# Patient Record
Sex: MALE | Race: OTHER | HISPANIC OR LATINO | URBAN - METROPOLITAN AREA
[De-identification: names, ages, dates, MRNs, and addresses within clinical notes are randomized per-mention and may not be internally consistent; named-entity substitution may affect disease eponyms.]

---

## 2023-10-21 ENCOUNTER — EMERGENCY (EMERGENCY)
Facility: HOSPITAL | Age: 23
LOS: 0 days | Discharge: HOME | End: 2023-10-21
Attending: STUDENT IN AN ORGANIZED HEALTH CARE EDUCATION/TRAINING PROGRAM
Payer: COMMERCIAL

## 2023-10-21 VITALS
DIASTOLIC BLOOD PRESSURE: 78 MMHG | RESPIRATION RATE: 14 BRPM | SYSTOLIC BLOOD PRESSURE: 154 MMHG | HEART RATE: 88 BPM | OXYGEN SATURATION: 100 % | TEMPERATURE: 98 F | HEIGHT: 68 IN | WEIGHT: 199.96 LBS

## 2023-10-21 VITALS
OXYGEN SATURATION: 100 % | RESPIRATION RATE: 20 BRPM | DIASTOLIC BLOOD PRESSURE: 86 MMHG | TEMPERATURE: 98 F | HEART RATE: 99 BPM | SYSTOLIC BLOOD PRESSURE: 140 MMHG

## 2023-10-21 DIAGNOSIS — M79.602 PAIN IN LEFT ARM: ICD-10-CM
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
## 2023-10-21 DIAGNOSIS — S42.302A UNSPECIFIED FRACTURE OF SHAFT OF HUMERUS, LEFT ARM, INITIAL ENCOUNTER FOR CLOSED FRACTURE: ICD-10-CM

## 2023-10-21 DIAGNOSIS — Z23 ENCOUNTER FOR IMMUNIZATION: ICD-10-CM

## 2023-10-21 LAB
ALBUMIN SERPL ELPH-MCNC: 3.9 G/DL — SIGNIFICANT CHANGE UP (ref 3.3–5)
ALBUMIN SERPL ELPH-MCNC: 3.9 G/DL — SIGNIFICANT CHANGE UP (ref 3.3–5)
ALP SERPL-CCNC: 98 U/L — SIGNIFICANT CHANGE UP (ref 40–120)
ALP SERPL-CCNC: 98 U/L — SIGNIFICANT CHANGE UP (ref 40–120)
ALT FLD-CCNC: 101 U/L — HIGH (ref 12–78)
ALT FLD-CCNC: 101 U/L — HIGH (ref 12–78)
ANION GAP SERPL CALC-SCNC: 6 MMOL/L — SIGNIFICANT CHANGE UP (ref 5–17)
ANION GAP SERPL CALC-SCNC: 6 MMOL/L — SIGNIFICANT CHANGE UP (ref 5–17)
APTT BLD: 27.9 SEC — SIGNIFICANT CHANGE UP (ref 24.5–35.6)
APTT BLD: 27.9 SEC — SIGNIFICANT CHANGE UP (ref 24.5–35.6)
AST SERPL-CCNC: 45 U/L — HIGH (ref 15–37)
AST SERPL-CCNC: 45 U/L — HIGH (ref 15–37)
BASOPHILS # BLD AUTO: 0.04 K/UL — SIGNIFICANT CHANGE UP (ref 0–0.2)
BASOPHILS # BLD AUTO: 0.04 K/UL — SIGNIFICANT CHANGE UP (ref 0–0.2)
BASOPHILS NFR BLD AUTO: 0.3 % — SIGNIFICANT CHANGE UP (ref 0–2)
BASOPHILS NFR BLD AUTO: 0.3 % — SIGNIFICANT CHANGE UP (ref 0–2)
BILIRUB SERPL-MCNC: 0.4 MG/DL — SIGNIFICANT CHANGE UP (ref 0.2–1.2)
BILIRUB SERPL-MCNC: 0.4 MG/DL — SIGNIFICANT CHANGE UP (ref 0.2–1.2)
BLD GP AB SCN SERPL QL: SIGNIFICANT CHANGE UP
BLD GP AB SCN SERPL QL: SIGNIFICANT CHANGE UP
BUN SERPL-MCNC: 15 MG/DL — SIGNIFICANT CHANGE UP (ref 7–23)
BUN SERPL-MCNC: 15 MG/DL — SIGNIFICANT CHANGE UP (ref 7–23)
CALCIUM SERPL-MCNC: 8.5 MG/DL — SIGNIFICANT CHANGE UP (ref 8.5–10.1)
CALCIUM SERPL-MCNC: 8.5 MG/DL — SIGNIFICANT CHANGE UP (ref 8.5–10.1)
CHLORIDE SERPL-SCNC: 103 MMOL/L — SIGNIFICANT CHANGE UP (ref 96–108)
CHLORIDE SERPL-SCNC: 103 MMOL/L — SIGNIFICANT CHANGE UP (ref 96–108)
CO2 SERPL-SCNC: 28 MMOL/L — SIGNIFICANT CHANGE UP (ref 22–31)
CO2 SERPL-SCNC: 28 MMOL/L — SIGNIFICANT CHANGE UP (ref 22–31)
CREAT SERPL-MCNC: 0.93 MG/DL — SIGNIFICANT CHANGE UP (ref 0.5–1.3)
CREAT SERPL-MCNC: 0.93 MG/DL — SIGNIFICANT CHANGE UP (ref 0.5–1.3)
EGFR: 118 ML/MIN/1.73M2 — SIGNIFICANT CHANGE UP
EGFR: 118 ML/MIN/1.73M2 — SIGNIFICANT CHANGE UP
EOSINOPHIL # BLD AUTO: 0.15 K/UL — SIGNIFICANT CHANGE UP (ref 0–0.5)
EOSINOPHIL # BLD AUTO: 0.15 K/UL — SIGNIFICANT CHANGE UP (ref 0–0.5)
EOSINOPHIL NFR BLD AUTO: 1.2 % — SIGNIFICANT CHANGE UP (ref 0–6)
EOSINOPHIL NFR BLD AUTO: 1.2 % — SIGNIFICANT CHANGE UP (ref 0–6)
GLUCOSE SERPL-MCNC: 113 MG/DL — HIGH (ref 70–99)
GLUCOSE SERPL-MCNC: 113 MG/DL — HIGH (ref 70–99)
HCT VFR BLD CALC: 44.4 % — SIGNIFICANT CHANGE UP (ref 39–50)
HCT VFR BLD CALC: 44.4 % — SIGNIFICANT CHANGE UP (ref 39–50)
HGB BLD-MCNC: 14.8 G/DL — SIGNIFICANT CHANGE UP (ref 13–17)
HGB BLD-MCNC: 14.8 G/DL — SIGNIFICANT CHANGE UP (ref 13–17)
IMM GRANULOCYTES NFR BLD AUTO: 0.5 % — SIGNIFICANT CHANGE UP (ref 0–0.9)
IMM GRANULOCYTES NFR BLD AUTO: 0.5 % — SIGNIFICANT CHANGE UP (ref 0–0.9)
INR BLD: 1.05 RATIO — SIGNIFICANT CHANGE UP (ref 0.85–1.18)
INR BLD: 1.05 RATIO — SIGNIFICANT CHANGE UP (ref 0.85–1.18)
LYMPHOCYTES # BLD AUTO: 27.4 % — SIGNIFICANT CHANGE UP (ref 13–44)
LYMPHOCYTES # BLD AUTO: 27.4 % — SIGNIFICANT CHANGE UP (ref 13–44)
LYMPHOCYTES # BLD AUTO: 3.37 K/UL — HIGH (ref 1–3.3)
LYMPHOCYTES # BLD AUTO: 3.37 K/UL — HIGH (ref 1–3.3)
MCHC RBC-ENTMCNC: 27.4 PG — SIGNIFICANT CHANGE UP (ref 27–34)
MCHC RBC-ENTMCNC: 27.4 PG — SIGNIFICANT CHANGE UP (ref 27–34)
MCHC RBC-ENTMCNC: 33.3 GM/DL — SIGNIFICANT CHANGE UP (ref 32–36)
MCHC RBC-ENTMCNC: 33.3 GM/DL — SIGNIFICANT CHANGE UP (ref 32–36)
MCV RBC AUTO: 82.2 FL — SIGNIFICANT CHANGE UP (ref 80–100)
MCV RBC AUTO: 82.2 FL — SIGNIFICANT CHANGE UP (ref 80–100)
MONOCYTES # BLD AUTO: 0.93 K/UL — HIGH (ref 0–0.9)
MONOCYTES # BLD AUTO: 0.93 K/UL — HIGH (ref 0–0.9)
MONOCYTES NFR BLD AUTO: 7.6 % — SIGNIFICANT CHANGE UP (ref 2–14)
MONOCYTES NFR BLD AUTO: 7.6 % — SIGNIFICANT CHANGE UP (ref 2–14)
NEUTROPHILS # BLD AUTO: 7.73 K/UL — HIGH (ref 1.8–7.4)
NEUTROPHILS # BLD AUTO: 7.73 K/UL — HIGH (ref 1.8–7.4)
NEUTROPHILS NFR BLD AUTO: 63 % — SIGNIFICANT CHANGE UP (ref 43–77)
NEUTROPHILS NFR BLD AUTO: 63 % — SIGNIFICANT CHANGE UP (ref 43–77)
PLATELET # BLD AUTO: 289 K/UL — SIGNIFICANT CHANGE UP (ref 150–400)
PLATELET # BLD AUTO: 289 K/UL — SIGNIFICANT CHANGE UP (ref 150–400)
POTASSIUM SERPL-MCNC: 3.3 MMOL/L — LOW (ref 3.5–5.3)
POTASSIUM SERPL-MCNC: 3.3 MMOL/L — LOW (ref 3.5–5.3)
POTASSIUM SERPL-SCNC: 3.3 MMOL/L — LOW (ref 3.5–5.3)
POTASSIUM SERPL-SCNC: 3.3 MMOL/L — LOW (ref 3.5–5.3)
PROT SERPL-MCNC: 7.5 GM/DL — SIGNIFICANT CHANGE UP (ref 6–8.3)
PROT SERPL-MCNC: 7.5 GM/DL — SIGNIFICANT CHANGE UP (ref 6–8.3)
PROTHROM AB SERPL-ACNC: 11.9 SEC — SIGNIFICANT CHANGE UP (ref 9.5–13)
PROTHROM AB SERPL-ACNC: 11.9 SEC — SIGNIFICANT CHANGE UP (ref 9.5–13)
RBC # BLD: 5.4 M/UL — SIGNIFICANT CHANGE UP (ref 4.2–5.8)
RBC # BLD: 5.4 M/UL — SIGNIFICANT CHANGE UP (ref 4.2–5.8)
RBC # FLD: 13.7 % — SIGNIFICANT CHANGE UP (ref 10.3–14.5)
RBC # FLD: 13.7 % — SIGNIFICANT CHANGE UP (ref 10.3–14.5)
SODIUM SERPL-SCNC: 137 MMOL/L — SIGNIFICANT CHANGE UP (ref 135–145)
SODIUM SERPL-SCNC: 137 MMOL/L — SIGNIFICANT CHANGE UP (ref 135–145)
WBC # BLD: 12.28 K/UL — HIGH (ref 3.8–10.5)
WBC # BLD: 12.28 K/UL — HIGH (ref 3.8–10.5)
WBC # FLD AUTO: 12.28 K/UL — HIGH (ref 3.8–10.5)
WBC # FLD AUTO: 12.28 K/UL — HIGH (ref 3.8–10.5)

## 2023-10-21 PROCEDURE — 85610 PROTHROMBIN TIME: CPT

## 2023-10-21 PROCEDURE — 74177 CT ABD & PELVIS W/CONTRAST: CPT | Mod: MA

## 2023-10-21 PROCEDURE — 86901 BLOOD TYPING SEROLOGIC RH(D): CPT

## 2023-10-21 PROCEDURE — 85025 COMPLETE CBC W/AUTO DIFF WBC: CPT

## 2023-10-21 PROCEDURE — 80053 COMPREHEN METABOLIC PANEL: CPT

## 2023-10-21 PROCEDURE — 99285 EMERGENCY DEPT VISIT HI MDM: CPT | Mod: 25

## 2023-10-21 PROCEDURE — 99243 OFF/OP CNSLTJ NEW/EST LOW 30: CPT

## 2023-10-21 PROCEDURE — 96374 THER/PROPH/DIAG INJ IV PUSH: CPT | Mod: XU

## 2023-10-21 PROCEDURE — 86900 BLOOD TYPING SEROLOGIC ABO: CPT

## 2023-10-21 PROCEDURE — 71260 CT THORAX DX C+: CPT | Mod: 26,MA

## 2023-10-21 PROCEDURE — 93010 ELECTROCARDIOGRAM REPORT: CPT

## 2023-10-21 PROCEDURE — 71260 CT THORAX DX C+: CPT | Mod: MA

## 2023-10-21 PROCEDURE — 73080 X-RAY EXAM OF ELBOW: CPT | Mod: LT

## 2023-10-21 PROCEDURE — 73060 X-RAY EXAM OF HUMERUS: CPT | Mod: LT

## 2023-10-21 PROCEDURE — 90715 TDAP VACCINE 7 YRS/> IM: CPT

## 2023-10-21 PROCEDURE — 73060 X-RAY EXAM OF HUMERUS: CPT | Mod: 26,LT,76

## 2023-10-21 PROCEDURE — 93005 ELECTROCARDIOGRAM TRACING: CPT

## 2023-10-21 PROCEDURE — 99291 CRITICAL CARE FIRST HOUR: CPT

## 2023-10-21 PROCEDURE — 72125 CT NECK SPINE W/O DYE: CPT | Mod: MA

## 2023-10-21 PROCEDURE — 74177 CT ABD & PELVIS W/CONTRAST: CPT | Mod: 26,MA

## 2023-10-21 PROCEDURE — 24505 CLTX HUMRL SHFT FX W/MNPJ: CPT | Mod: LT

## 2023-10-21 PROCEDURE — 73080 X-RAY EXAM OF ELBOW: CPT | Mod: 26,LT

## 2023-10-21 PROCEDURE — 70450 CT HEAD/BRAIN W/O DYE: CPT | Mod: MA

## 2023-10-21 PROCEDURE — 96376 TX/PRO/DX INJ SAME DRUG ADON: CPT | Mod: XU

## 2023-10-21 PROCEDURE — 72125 CT NECK SPINE W/O DYE: CPT | Mod: 26,MA

## 2023-10-21 PROCEDURE — 36415 COLL VENOUS BLD VENIPUNCTURE: CPT

## 2023-10-21 PROCEDURE — 86850 RBC ANTIBODY SCREEN: CPT

## 2023-10-21 PROCEDURE — 70450 CT HEAD/BRAIN W/O DYE: CPT | Mod: 26,MA

## 2023-10-21 PROCEDURE — 85730 THROMBOPLASTIN TIME PARTIAL: CPT

## 2023-10-21 PROCEDURE — 96375 TX/PRO/DX INJ NEW DRUG ADDON: CPT | Mod: XU

## 2023-10-21 PROCEDURE — 90471 IMMUNIZATION ADMIN: CPT

## 2023-10-21 RX ORDER — DIAZEPAM 5 MG
5 TABLET ORAL ONCE
Refills: 0 | Status: DISCONTINUED | OUTPATIENT
Start: 2023-10-21 | End: 2023-10-21

## 2023-10-21 RX ORDER — HYDROMORPHONE HYDROCHLORIDE 2 MG/ML
1 INJECTION INTRAMUSCULAR; INTRAVENOUS; SUBCUTANEOUS ONCE
Refills: 0 | Status: DISCONTINUED | OUTPATIENT
Start: 2023-10-21 | End: 2023-10-21

## 2023-10-21 RX ORDER — CEFAZOLIN SODIUM 1 G
1000 VIAL (EA) INJECTION ONCE
Refills: 0 | Status: COMPLETED | OUTPATIENT
Start: 2023-10-21 | End: 2023-10-21

## 2023-10-21 RX ORDER — CEFAZOLIN SODIUM 1 G
1000 VIAL (EA) INJECTION ONCE
Refills: 0 | Status: DISCONTINUED | OUTPATIENT
Start: 2023-10-21 | End: 2023-10-21

## 2023-10-21 RX ORDER — SODIUM CHLORIDE 9 MG/ML
1000 INJECTION INTRAMUSCULAR; INTRAVENOUS; SUBCUTANEOUS ONCE
Refills: 0 | Status: COMPLETED | OUTPATIENT
Start: 2023-10-21 | End: 2023-10-21

## 2023-10-21 RX ORDER — TETANUS TOXOID, REDUCED DIPHTHERIA TOXOID AND ACELLULAR PERTUSSIS VACCINE, ADSORBED 5; 2.5; 8; 8; 2.5 [IU]/.5ML; [IU]/.5ML; UG/.5ML; UG/.5ML; UG/.5ML
0.5 SUSPENSION INTRAMUSCULAR ONCE
Refills: 0 | Status: COMPLETED | OUTPATIENT
Start: 2023-10-21 | End: 2023-10-21

## 2023-10-21 RX ORDER — ONDANSETRON 8 MG/1
4 TABLET, FILM COATED ORAL ONCE
Refills: 0 | Status: COMPLETED | OUTPATIENT
Start: 2023-10-21 | End: 2023-10-21

## 2023-10-21 RX ORDER — FENTANYL CITRATE 50 UG/ML
100 INJECTION INTRAVENOUS ONCE
Refills: 0 | Status: DISCONTINUED | OUTPATIENT
Start: 2023-10-21 | End: 2023-10-21

## 2023-10-21 RX ORDER — OXYCODONE AND ACETAMINOPHEN 5; 325 MG/1; MG/1
1 TABLET ORAL
Qty: 16 | Refills: 0
Start: 2023-10-21 | End: 2023-10-24

## 2023-10-21 RX ADMIN — Medication 5 MILLIGRAM(S): at 14:43

## 2023-10-21 RX ADMIN — HYDROMORPHONE HYDROCHLORIDE 1 MILLIGRAM(S): 2 INJECTION INTRAMUSCULAR; INTRAVENOUS; SUBCUTANEOUS at 13:43

## 2023-10-21 RX ADMIN — SODIUM CHLORIDE 1000 MILLILITER(S): 9 INJECTION INTRAMUSCULAR; INTRAVENOUS; SUBCUTANEOUS at 13:43

## 2023-10-21 RX ADMIN — Medication 1000 MILLIGRAM(S): at 14:09

## 2023-10-21 RX ADMIN — HYDROMORPHONE HYDROCHLORIDE 1 MILLIGRAM(S): 2 INJECTION INTRAMUSCULAR; INTRAVENOUS; SUBCUTANEOUS at 16:00

## 2023-10-21 RX ADMIN — HYDROMORPHONE HYDROCHLORIDE 1 MILLIGRAM(S): 2 INJECTION INTRAMUSCULAR; INTRAVENOUS; SUBCUTANEOUS at 17:40

## 2023-10-21 RX ADMIN — ONDANSETRON 4 MILLIGRAM(S): 8 TABLET, FILM COATED ORAL at 13:43

## 2023-10-21 RX ADMIN — HYDROMORPHONE HYDROCHLORIDE 1 MILLIGRAM(S): 2 INJECTION INTRAMUSCULAR; INTRAVENOUS; SUBCUTANEOUS at 16:46

## 2023-10-21 RX ADMIN — FENTANYL CITRATE 100 MICROGRAM(S): 50 INJECTION INTRAVENOUS at 16:07

## 2023-10-21 RX ADMIN — HYDROMORPHONE HYDROCHLORIDE 1 MILLIGRAM(S): 2 INJECTION INTRAMUSCULAR; INTRAVENOUS; SUBCUTANEOUS at 14:10

## 2023-10-21 RX ADMIN — TETANUS TOXOID, REDUCED DIPHTHERIA TOXOID AND ACELLULAR PERTUSSIS VACCINE, ADSORBED 0.5 MILLILITER(S): 5; 2.5; 8; 8; 2.5 SUSPENSION INTRAMUSCULAR at 14:08

## 2023-10-21 RX ADMIN — HYDROMORPHONE HYDROCHLORIDE 1 MILLIGRAM(S): 2 INJECTION INTRAMUSCULAR; INTRAVENOUS; SUBCUTANEOUS at 14:43

## 2023-10-21 RX ADMIN — HYDROMORPHONE HYDROCHLORIDE 1 MILLIGRAM(S): 2 INJECTION INTRAMUSCULAR; INTRAVENOUS; SUBCUTANEOUS at 15:26

## 2023-10-21 NOTE — CONSULT NOTE ADULT - ATTENDING COMMENTS
A/P:  Left humerus fracture  Pt cleared from orthopedic standpoint s/p splinting/reduction, with outpt follow up  No other acute trauma surgical pathology to workup/exam  Pt stable/cleared from trauma surgical standpoint for d/c

## 2023-10-21 NOTE — CONSULT NOTE ADULT - SUBJECTIVE AND OBJECTIVE BOX
CC:Patient is a 23y old Male who presents with a chief complaint of left arm deformity after mechanical fall from 5 feet    Subjective:  Pt seen and examined at bedside with chaperone, where his support system is bedside, assists with translation of Swedish. Pt is AAOx4, pt in pain as he is being repositioned for orthopedic reduction. Denies headstrike. Pt denied c/o fever, chills, chest pain, SOB, abd pain, N/V/D, headache, diplopia, dizziness, paresthesias.     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, drinking, illicits  FH: Noncontributory as he had mechanical fall while working    Vital Signs Last 24 Hrs  T(C): 36.8 (21 Oct 2023 13:34), Max: 36.8 (21 Oct 2023 13:34)  T(F): 98.2 (21 Oct 2023 13:34), Max: 98.2 (21 Oct 2023 13:34)  HR: 96 (21 Oct 2023 16:00) (82 - 105)  BP: 136/81 (21 Oct 2023 16:00) (108/58 - 154/78)  BP(mean): --  RR: 16 (21 Oct 2023 16:00) (14 - 23)  SpO2: 97% (21 Oct 2023 16:00) (97% - 100%)    Parameters below as of 21 Oct 2023 16:00  Patient On (Oxygen Delivery Method): room air      Labs:                                14.8   12.28 )-----------( 289      ( 21 Oct 2023 13:34 )             44.4     CBC Full  -  ( 21 Oct 2023 13:34 )  WBC Count : 12.28 K/uL  RBC Count : 5.40 M/uL  Hemoglobin : 14.8 g/dL  Hematocrit : 44.4 %  Platelet Count - Automated : 289 K/uL  Mean Cell Volume : 82.2 fl  Mean Cell Hemoglobin : 27.4 pg  Mean Cell Hemoglobin Concentration : 33.3 gm/dL  Auto Neutrophil # : 7.73 K/uL  Auto Lymphocyte # : 3.37 K/uL  Auto Monocyte # : 0.93 K/uL  Auto Eosinophil # : 0.15 K/uL  Auto Basophil # : 0.04 K/uL  Auto Neutrophil % : 63.0 %  Auto Lymphocyte % : 27.4 %  Auto Monocyte % : 7.6 %  Auto Eosinophil % : 1.2 %  Auto Basophil % : 0.3 %    10-21    137  |  103  |  15  ----------------------------<  113<H>  3.3<L>   |  28  |  0.93    Ca    8.5      21 Oct 2023 13:34    TPro  7.5  /  Alb  3.9  /  TBili  0.4  /  DBili  x   /  AST  45<H>  /  ALT  101<H>  /  AlkPhos  98  10-21    LIVER FUNCTIONS - ( 21 Oct 2023 13:34 )  Alb: 3.9 g/dL / Pro: 7.5 gm/dL / ALK PHOS: 98 U/L / ALT: 101 U/L / AST: 45 U/L / GGT: x           PT/INR - ( 21 Oct 2023 13:34 )   PT: 11.9 sec;   INR: 1.05 ratio         PTT - ( 21 Oct 2023 13:34 )  PTT:27.9 sec      Meds:  HYDROmorphone  Injectable 1 milliGRAM(s) IV Push Once      Radiology:      Physical exam:  GCS of 15  Airway is patent  Breathing is symmetric and unlabored  Neuro: CNII-XII grossly intact  Psych: normal affect  HEENT: Normocephalic, atraumatic, JONATHON, EOM wnl, no otorrhea or hemotympanum b/l, no epistaxis or d/c b/l nares, no craniofacial bony pathology or tenderness b/l  Neck: No crepitus, no ecchymosis, no hematoma, to exam, no JVD, no tracheal deviation  Cspine/thoracolumbrosacral spine: no gross bony pathology or tenderness to exam  Cardiovascular: S1S2 Present, no M/R/G  Chest: no gross rib pathology or tenderness to exam. No sternal pathology or tenderness to exam. No crepitus, no ecchymosis, no hematoma. No penetrating thoracoabdominal trauma  Respiratory: Rate is 18; Respiratory Effort normal; no wheezes, rales or rhonchi to exam  ABD: bowel sounds (+), soft, nontender, non distended, no rebound, no guarding, no rigidity, no skin changes to exam. No pelvic instability to exam, no skin changes  Rectal: Normal gluteal tone  Genitourinary: No scrotal/perineal/perirectal hematoma/ecchymosis/tenderness to exam  External genitalia: normal, no blood at urethral meatus  Musculoskeletal: Pt has palpable b/l radial, femoral, dorsalis pedis pulses. All digits are warm and well perfused. Left humerus with gross deformity and otherwise without gross long bone pathology or tenderness to exam. Pt demonstrates grossly intact sensoromotor function except for left hand thumb extension and wrist extension limited by pain. Pt has good capillary refill to digits, no calf edema or tenderness to exam.  Skin: no lesions or rashes to exam    Imaging:    10/21/23 Left humerus XR    IMPRESSION: Left humeral fracture as above. Marked angulation.    10/21/23 CT head & C-spine    IMPRESSION:    Head CT: No CT evidence of acute intracranial hemorrhage.    C-spine CT:  No acute fracture.    10/21/23 CT C/A/P    IMPRESSION:  No findings suspicious for acute intrathoracic or intra-abdominal   visceral injury.  Left humerus fractures.    10/21/23 XR left humerus post reduction and left elbow    IMPRESSION: Extremely angulated deformity of the left humeral fracture   has been reduced and splint applied. CC:Patient is a 23y old Male who presents with a chief complaint of left arm deformity after mechanical fall from 5 feet    Subjective:  Pt seen and examined at bedside with chaperone, where his support system is bedside, assists with translation of Czech. Pt is AAOx4, pt in pain as he is being repositioned for orthopedic reduction. Denies headstrike. Pt denied c/o fever, chills, chest pain, SOB, abd pain, N/V/D, headache, diplopia, dizziness, paresthesias.     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, drinking, illicits  FH: Noncontributory as he had mechanical fall while working    Vital Signs Last 24 Hrs  T(C): 36.8 (21 Oct 2023 13:34), Max: 36.8 (21 Oct 2023 13:34)  T(F): 98.2 (21 Oct 2023 13:34), Max: 98.2 (21 Oct 2023 13:34)  HR: 96 (21 Oct 2023 16:00) (82 - 105)  BP: 136/81 (21 Oct 2023 16:00) (108/58 - 154/78)  BP(mean): --  RR: 16 (21 Oct 2023 16:00) (14 - 23)  SpO2: 97% (21 Oct 2023 16:00) (97% - 100%)    Parameters below as of 21 Oct 2023 16:00  Patient On (Oxygen Delivery Method): room air      Labs:                                14.8   12.28 )-----------( 289      ( 21 Oct 2023 13:34 )             44.4     CBC Full  -  ( 21 Oct 2023 13:34 )  WBC Count : 12.28 K/uL  RBC Count : 5.40 M/uL  Hemoglobin : 14.8 g/dL  Hematocrit : 44.4 %  Platelet Count - Automated : 289 K/uL  Mean Cell Volume : 82.2 fl  Mean Cell Hemoglobin : 27.4 pg  Mean Cell Hemoglobin Concentration : 33.3 gm/dL  Auto Neutrophil # : 7.73 K/uL  Auto Lymphocyte # : 3.37 K/uL  Auto Monocyte # : 0.93 K/uL  Auto Eosinophil # : 0.15 K/uL  Auto Basophil # : 0.04 K/uL  Auto Neutrophil % : 63.0 %  Auto Lymphocyte % : 27.4 %  Auto Monocyte % : 7.6 %  Auto Eosinophil % : 1.2 %  Auto Basophil % : 0.3 %    10-21    137  |  103  |  15  ----------------------------<  113<H>  3.3<L>   |  28  |  0.93    Ca    8.5      21 Oct 2023 13:34    TPro  7.5  /  Alb  3.9  /  TBili  0.4  /  DBili  x   /  AST  45<H>  /  ALT  101<H>  /  AlkPhos  98  10-21    LIVER FUNCTIONS - ( 21 Oct 2023 13:34 )  Alb: 3.9 g/dL / Pro: 7.5 gm/dL / ALK PHOS: 98 U/L / ALT: 101 U/L / AST: 45 U/L / GGT: x           PT/INR - ( 21 Oct 2023 13:34 )   PT: 11.9 sec;   INR: 1.05 ratio         PTT - ( 21 Oct 2023 13:34 )  PTT:27.9 sec      Meds:  HYDROmorphone  Injectable 1 milliGRAM(s) IV Push Once      Radiology:      Physical exam:  GCS of 15  Airway is patent  Breathing is symmetric and unlabored  Neuro: CNII-XII grossly intact  Psych: normal affect  HEENT: Normocephalic, atraumatic, JONATHON, EOM wnl, no otorrhea or hemotympanum b/l, no epistaxis or d/c b/l nares, no craniofacial bony pathology or tenderness b/l  Neck: No crepitus, no ecchymosis, no hematoma, to exam, no JVD, no tracheal deviation  Cspine/thoracolumbrosacral spine: no gross bony pathology or tenderness to exam  Cardiovascular: S1S2 Present, no M/R/G  Chest: no gross rib pathology or tenderness to exam. No sternal pathology or tenderness to exam. No crepitus, no ecchymosis, no hematoma. No penetrating thoracoabdominal trauma  Respiratory: Rate is 18; Respiratory Effort normal; no wheezes, rales or rhonchi to exam  ABD: bowel sounds (+), soft, nontender, non distended, no rebound, no guarding, no rigidity, no skin changes to exam. No pelvic instability to exam, no skin changes  Rectal: Normal gluteal tone  Genitourinary: No scrotal/perineal/perirectal hematoma/ecchymosis/tenderness to exam  External genitalia: normal, no blood at urethral meatus  Musculoskeletal: Pt has palpable b/l radial, femoral, dorsalis pedis pulses. All digits are warm and well perfused. Left humerus with gross deformity and otherwise without gross long bone pathology or tenderness to exam. Pt demonstrates grossly intact sensoromotor function except for left hand thumb extension and wrist extension limited by pain. Pt has good capillary refill to digits, no calf edema or tenderness to exam.  Skin: no lesions or rashes to exam    Imaging:  < from: Xray Elbow AP + Lateral, Left (10.21.23 @ 17:08) >  ACC: [id number] EXAM:  XR ELBOW COMP MIN 3V LT   ORDERED BY: ALBERTA WILLETT     ACC: [id number] EXAM:  XR HUMERUS ID MIN 2V LT   ORDERED BY: SIVA MAC     PROCEDURE DATE:  10/21/2023          INTERPRETATION:  Left humerus and elbow. Prior imaging showed a midshaft   fracture left humerus with comminution and steep angulation.    Left humerus. 2 views. 4 images.    There is marked reduction in the angulation deformity with splint applied.    Left elbow. 2 views.    The elbow appears free of degeneration.    IMPRESSION: Extremely angulated deformity of the left humeral fracture   has been reduced and splint applied.    --- End of Report ---            TRUMAN GRAVES MD; Attending Radiologist  This document has been electronically signed. Oct [id number]  5:11PM    < end of copied text >      10/21/23 Left humerus XR    IMPRESSION: Left humeral fracture as above. Marked angulation.    10/21/23 CT head & C-spine    IMPRESSION:    Head CT: No CT evidence of acute intracranial hemorrhage.    C-spine CT:  No acute fracture.    10/21/23 CT C/A/P    IMPRESSION:  No findings suspicious for acute intrathoracic or intra-abdominal   visceral injury.  Left humerus fractures.    10/21/23 XR left humerus post reduction and left elbow    IMPRESSION: Extremely angulated deformity of the left humeral fracture   has been reduced and splint applied.

## 2023-10-21 NOTE — ED PROVIDER NOTE - SKIN, MLM
Skin normal color for race, warm, dry and intact. No evidence of rash. Skin normal color for race, warm, dry and intact. No evidence of rash. abrasion to the posterior elbow Skin normal color for race, warm, dry and intact. (+) abrasion to the posterior elbow

## 2023-10-21 NOTE — ED PROVIDER NOTE - CONSTITUTIONAL, MLM
normal... Well appearing, awake, alert, oriented to person, place, time/situation and in no apparent distress. Well appearing, awake, alert, oriented to person, place, time/situation and in moderate apparent painful distress.

## 2023-10-21 NOTE — ED PROVIDER NOTE - NSFOLLOWUPINSTRUCTIONS_ED_ALL_ED_FT
Keep splint on, in place, clean & DRY.  Wear arm sling as often as tolerated.  Take pain medications as prescribed.  Follow up this upcoming week with Dr. Love, as listed below.      Humerus Fracture Treated With Immobilization  Right arm and hand showing skeleton with a humerus fracture.  The humerus is the large bone in the upper arm. A broken (fractured) humerus is often treated by wearing a cast, splint, or sling. This holds the broken pieces in place (immobilization) so they can heal.    What are the causes?  This condition may be caused by:  A fall.  A hard, direct hit to the arm.  A car accident.  What increases the risk?  You are more likely to develop this condition if:  You have a disease that makes the bones thin and weak.  You are elderly.  What are the signs or symptoms?  Pain.  Swelling.  Bruising.  Not being able to move your arm normally.  How is this treated?  Treatment involves wearing a cast, splint, or sling until your arm heals enough for you to begin range-of-motion exercises. You may also be prescribed pain medicine.    Follow these instructions at home:  If you have a cast or splint that cannot be taken off:    Do not put pressure on any part of the cast or splint until it is fully hardened. This may take several hours.  Do not stick anything inside the cast or splint to scratch your skin.  Check the skin around the cast or splint every day. Tell your doctor if you see problems.  You may put lotion on dry skin around the cast or splint. Do not put lotion on the skin under the cast or splint.  Keep the cast clean and dry.  If you have a splint or sling that can be taken off:    Wear the splint or sling as told by your doctor. Remove it only as told by your doctor.  Loosen the splint or sling if your fingers:  Tingle.  Become numb.  Turn cold and blue.  Keep the splint or sling clean and dry.  Bathing    Do not take baths, swim, or use a hot tub. Ask your doctor about taking showers or sponge baths.  If your cast, splint, or sling is not waterproof:  Do not let it get wet.  Cover it with a watertight covering when you take a bath or shower.  Managing pain, stiffness, and swelling    Bag of ice on a towel on the skin.  If told, put ice on the injured area. To do this:  If you have a removable splint or sling, take it off as told by your doctor.  Put ice in a plastic bag.  Place a towel between your skin and the bag or between your cast or splint that you cannot take off and the bag.  Leave the ice on for 20 minutes, 2–3 times a day.  Take off the ice if your skin turns bright red. This is very important. If you cannot feel pain, heat, or cold, you have a greater risk of damage to the area.  Move your fingers often.  Raise the injured area above the level of your heart while you are sitting or lying down.  Driving    Do not drive or use machines while taking prescription pain medicine.  Ask your doctor when it is safe to drive if you have a cast, splint, or sling on your arm.  Activity    Do not lift anything until your doctor says that it is safe.  Return to your normal activities when your doctor says that it is safe.  Do range-of-motion exercises only as told by your doctor.  General instructions    Do not smoke or use any products that contain nicotine or tobacco. These can make it take longer for your bones to heal. If you need help quitting, ask your doctor.  Take over-the-counter and prescription medicines only as told by your doctor.  If told, take steps to prevent problems with pooping (constipation). You may need to:  Drink enough fluid to keep your pee (urine) pale yellow.  Take medicines. You will be told what medicines to take.  Eat foods that are high in fiber. These include beans, whole grains, and fresh fruits and vegetables.  Limit foods that are high in fat and sugar. These include fried or sweet foods.  Keep all follow-up visits.  Contact a doctor if:  You have any new pain, swelling, or bruising.  Your pain, swelling, and bruising do not get better.  Your cast, splint, or sling becomes loose or damaged.  Get help right away if:  Your skin or fingers on your injured arm turn blue or gray.  Your arm is cold or numb.  You have very bad pain in your injured arm.  Summary  The humerus is the large bone in the upper arm.  A broken humerus is often treated by wearing a cast, splint, or sling.  Wear a splint or sling as told by your doctor. Remove it only as told by your doctor.  Move your fingers often.  This information is not intended to replace advice given to you by your health care provider. Make sure you discuss any questions you have with your health care provider.

## 2023-10-21 NOTE — ED PROVIDER NOTE - PROGRESS NOTE DETAILS
Kierra Deluca for attending Dr. Conn   Signed out to Dr. Martínez Kierra Deluca for attending Dr. Conn   Signed out to Dr. Martínez at 2pm pending all diagnostics. Fabien KING: Dr. Love contacted- aware; ortho residents called- will evaluate patient.

## 2023-10-21 NOTE — ED ADULT TRIAGE NOTE - CHIEF COMPLAINT QUOTE
patient brought in by EMS s/p fall off back of work truck approx 6 feet.  denies head strike, denies LOC.  c/o left upper arm pain, splint in place upon arrival.  c/o left lower back / hip pain.  given 5 mg morphine PTA by EMS.  no PMH, no meds.  Dr. Conn to triage for eval, trauma alert initiated.

## 2023-10-21 NOTE — ED ADULT NURSE NOTE - NSFALLRISKINTERV_ED_ALL_ED

## 2023-10-21 NOTE — ED PROVIDER NOTE - PATIENT PORTAL LINK FT
You can access the FollowMyHealth Patient Portal offered by [facility] by registering at the following website: [email or web address]. By joining [facility]’s FollowMyHealth portal, you will also be able to view your health information using other applications (apps) compatible with our system.

## 2023-10-21 NOTE — ED PROVIDER NOTE - MUSCULOSKELETAL, MLM
Spine appears normal, range of motion is not limited, no muscle or joint tenderness LUE deformity. ttp to the left proximal humerus, elbow and forearm, left flank pain LUE deformity. ttp to the left proximal humerus, elbow and forearm; distal pulses intact;

## 2023-10-21 NOTE — CONSULT NOTE ADULT - ASSESSMENT
Patient is a 23y old Male who presents after mechanical fall from 5 feet, sustaining a left humeral fracture with significant angulation. He has no other injuries identified on trauma imaging and exam, where he can likely be discharged pending Orthopedic evaluation.    -F/u Orthopedic reduction & recommendations  -No acute trauma surgery intervention  -Dispo per ED after adequate reduction    Discussed with Dr. Reese.

## 2023-10-21 NOTE — ED PROVIDER NOTE - OBJECTIVE STATEMENT
22 yo male presents to the ED s/p fall. Pt was working on the back of truck when he slipped and fell from a height of 5-6 feet. Pt landed on his left side. Pt c/o left shoulder and left arm pain. As per EMS, pt was in severe pain and was given 5mg of morphine PTA. Trauma alert called upon arrival. 24 yo male presents to the ED s/p fall. Pt was working on the back of truck when he slipped and fell from a height of 5 feet. Pt landed on his left side. Pt c/o left shoulder, left arm pain, left sided back pain, and left hip pain. As per EMS, pt was in severe pain and was given 5mg of morphine PTA. Trauma alert called upon arrival. NKDA. Pt had no surgeries in the past. Pt is right handed.

## 2023-10-21 NOTE — ED ADULT NURSE REASSESSMENT NOTE - NS ED NURSE REASSESS COMMENT FT1
Pt care assumed from previous RNKimi. Pt is aware of POC to d/c home and follow up w/ ortho outpatient. Safety and comfort measures in place at this time.

## 2023-10-21 NOTE — CONSULT NOTE ADULT - SUBJECTIVE AND OBJECTIVE BOX
Patient is a 23yMale RHD who presents to [place] ED w/ a c/o of left arm pain. Patient states that earlier today, he fell off a stationary truck. Patient denies head strike or LOC. Patient states that he feels like his wrist is sleeping and is unable to extend it. Denies having any other pain elsewhere. No other orthopedic concerns at this time.    No pertinent past medical history        No Known Allergies      PHYSICAL EXAM:  T(C): 36.8 (10-21-23 @ 13:34), Max: 36.8 (10-21-23 @ 13:34)  HR: 96 (10-21-23 @ 16:00) (82 - 105)  BP: 136/81 (10-21-23 @ 16:00) (108/58 - 154/78)  RR: 16 (10-21-23 @ 16:00) (14 - 23)  SpO2: 97% (10-21-23 @ 16:00) (97% - 100%)    Gen: NAD    LUE:  Skin intact  Deformity present of humerus  TTP over midshaft  SILT C5-T1  Weakness in extension of wrist, fingers, and thumb  Compartments soft and compressible  2+ RP      Secondary Assessment:  NC/AT, NTTP of clavicles, NTTP of C-,T-,L-Spine, NTTP of Pelvis  RUE: NTTP of Shoulder, Elbow, Wrist, Hand; NT with AROM/PROM of Shoulder, Elbow, Wrist, Hand; AIN/PIN/Med/Uln/Msc/Rad/Ax intact  LLE: Able to SLR, NT with Log Roll, NT with Heel Strike, NTTP of Hip, Knee, Ankles, Feet; NT with AROM/PROM of Hip, Knee, Ankle, Foot; Q/H/Gsc/TA/EHL/FHL intact  RLE: Able to SLR, NT with Log Roll, NT with Heel Strike, NTTP of Hip, Knee, Ankles, Feet; NT with AROM/PROM of Hip, Knee, Ankle, Foot; Q/H/Gsc/TA/EHL/FHL intact      Procedure:  Closed reduction was performed and a well molded, well padded coaptation plaster splint was applied. The patient tolerated the procedure well and there we no complications. The patient's post-reduction neurovascular exam was unchanged. Patient has continued weakness of extension of wrist, fingers, and thumb. Post-reduction xrays demonstrated acceptable alignment.    A/P: 23M who presents with a L humeral shaft fx    Analgesia  NWB LUE  DVT ppx per primary team  PT/OT  Ice and elevate as tolerated  Orthopedically stable for discharge  FU with Dr. Love outpatient in office  Will discuss plan with Dr. Love and will advise changes to plan as advised

## 2023-10-21 NOTE — ED PROVIDER NOTE - CLINICAL SUMMARY MEDICAL DECISION MAKING FREE TEXT BOX
22 yo s/p approx 5 feet fall of truck with LUE pain/deformity. XR r/o fracture, CT imaging, pain control, ortho consult, reeval. 22 yo s/p approx 5 feet fall of truck with LUE pain/deformity. XR r/o fracture, CT imaging, pain control, ortho consult, reeval.    Fabien DO: S/o to Dr. Martínez at 2pm pending all work up at this time. 24 yo s/p approx 5 feet fall of truck with LUE pain/deformity. XR r/o fracture, CT imaging, pain control, ortho consult, reeval.    Fabien DO: S/o to Dr. Martínez at 2pm pending all work up at this time.    19:15, CC:  Trauma consult & clearance appreciated.  Hand Ortho ED consult appreciated incl. cmr of humerus fracture, currently splint/sling in place.  Pt for DC home on po pain meds, office f/u this upcoming week Dr. Love.

## 2023-11-02 PROBLEM — Z78.9 OTHER SPECIFIED HEALTH STATUS: Chronic | Status: ACTIVE | Noted: 2023-10-21

## 2023-11-03 ENCOUNTER — OUTPATIENT (OUTPATIENT)
Dept: INPATIENT UNIT | Facility: HOSPITAL | Age: 23
LOS: 1 days | Discharge: HOME | End: 2023-11-03
Payer: COMMERCIAL

## 2023-11-03 VITALS — WEIGHT: 259.93 LBS | OXYGEN SATURATION: 99 % | HEIGHT: 67 IN | TEMPERATURE: 99 F | HEART RATE: 71 BPM

## 2023-11-03 DIAGNOSIS — S44.22XD: ICD-10-CM

## 2023-11-03 DIAGNOSIS — S42.352D DISPLACED COMMINUTED FRACTURE OF SHAFT OF HUMERUS, LEFT ARM, SUBSEQUENT ENCOUNTER FOR FRACTURE WITH ROUTINE HEALING: ICD-10-CM

## 2023-11-03 PROCEDURE — 76000 FLUOROSCOPY <1 HR PHYS/QHP: CPT

## 2023-11-03 PROCEDURE — 97161 PT EVAL LOW COMPLEX 20 MIN: CPT | Mod: GP

## 2023-11-03 PROCEDURE — C9399: CPT

## 2023-11-03 PROCEDURE — 80048 BASIC METABOLIC PNL TOTAL CA: CPT

## 2023-11-03 PROCEDURE — 97116 GAIT TRAINING THERAPY: CPT | Mod: GP

## 2023-11-03 PROCEDURE — 36415 COLL VENOUS BLD VENIPUNCTURE: CPT

## 2023-11-03 PROCEDURE — C1889: CPT

## 2023-11-03 PROCEDURE — C1713: CPT

## 2023-11-03 PROCEDURE — 73060 X-RAY EXAM OF HUMERUS: CPT | Mod: LT

## 2023-11-03 PROCEDURE — 73060 X-RAY EXAM OF HUMERUS: CPT | Mod: 26,LT,76

## 2023-11-03 PROCEDURE — 85027 COMPLETE CBC AUTOMATED: CPT

## 2023-11-03 RX ORDER — TRAMADOL HYDROCHLORIDE 50 MG/1
50 TABLET ORAL EVERY 6 HOURS
Refills: 0 | Status: DISCONTINUED | OUTPATIENT
Start: 2023-11-03 | End: 2023-11-04

## 2023-11-03 RX ORDER — SENNA PLUS 8.6 MG/1
2 TABLET ORAL AT BEDTIME
Refills: 0 | Status: DISCONTINUED | OUTPATIENT
Start: 2023-11-03 | End: 2023-11-04

## 2023-11-03 RX ORDER — ACETAMINOPHEN 500 MG
1000 TABLET ORAL ONCE
Refills: 0 | Status: DISCONTINUED | OUTPATIENT
Start: 2023-11-03 | End: 2023-11-04

## 2023-11-03 RX ORDER — OXYCODONE HYDROCHLORIDE 5 MG/1
5 TABLET ORAL ONCE
Refills: 0 | Status: DISCONTINUED | OUTPATIENT
Start: 2023-11-03 | End: 2023-11-03

## 2023-11-03 RX ORDER — OXYCODONE HYDROCHLORIDE 5 MG/1
10 TABLET ORAL
Refills: 0 | Status: DISCONTINUED | OUTPATIENT
Start: 2023-11-03 | End: 2023-11-04

## 2023-11-03 RX ORDER — ONDANSETRON 8 MG/1
4 TABLET, FILM COATED ORAL ONCE
Refills: 0 | Status: DISCONTINUED | OUTPATIENT
Start: 2023-11-03 | End: 2023-11-03

## 2023-11-03 RX ORDER — SODIUM CHLORIDE 9 MG/ML
1000 INJECTION, SOLUTION INTRAVENOUS
Refills: 0 | Status: DISCONTINUED | OUTPATIENT
Start: 2023-11-03 | End: 2023-11-04

## 2023-11-03 RX ORDER — ACETAMINOPHEN 500 MG
2 TABLET ORAL
Qty: 30 | Refills: 0
Start: 2023-11-03 | End: 2023-11-07

## 2023-11-03 RX ORDER — OXYCODONE HYDROCHLORIDE 5 MG/1
1 TABLET ORAL
Qty: 28 | Refills: 0
Start: 2023-11-03 | End: 2023-11-09

## 2023-11-03 RX ORDER — ONDANSETRON 8 MG/1
1 TABLET, FILM COATED ORAL
Qty: 12 | Refills: 0
Start: 2023-11-03

## 2023-11-03 RX ORDER — HYDROMORPHONE HYDROCHLORIDE 2 MG/ML
0.5 INJECTION INTRAMUSCULAR; INTRAVENOUS; SUBCUTANEOUS ONCE
Refills: 0 | Status: DISCONTINUED | OUTPATIENT
Start: 2023-11-03 | End: 2023-11-04

## 2023-11-03 RX ORDER — FENTANYL CITRATE 50 UG/ML
50 INJECTION INTRAVENOUS
Refills: 0 | Status: DISCONTINUED | OUTPATIENT
Start: 2023-11-03 | End: 2023-11-03

## 2023-11-03 RX ORDER — ACETAMINOPHEN 500 MG
1000 TABLET ORAL EVERY 8 HOURS
Refills: 0 | Status: DISCONTINUED | OUTPATIENT
Start: 2023-11-03 | End: 2023-11-04

## 2023-11-03 RX ORDER — OXYCODONE HYDROCHLORIDE 5 MG/1
5 TABLET ORAL
Refills: 0 | Status: DISCONTINUED | OUTPATIENT
Start: 2023-11-03 | End: 2023-11-04

## 2023-11-03 RX ORDER — CEFAZOLIN SODIUM 1 G
2000 VIAL (EA) INJECTION EVERY 8 HOURS
Refills: 0 | Status: COMPLETED | OUTPATIENT
Start: 2023-11-03 | End: 2023-11-04

## 2023-11-03 RX ORDER — HYDROMORPHONE HYDROCHLORIDE 2 MG/ML
0.5 INJECTION INTRAMUSCULAR; INTRAVENOUS; SUBCUTANEOUS ONCE
Refills: 0 | Status: DISCONTINUED | OUTPATIENT
Start: 2023-11-03 | End: 2023-11-03

## 2023-11-03 RX ORDER — SODIUM CHLORIDE 9 MG/ML
1000 INJECTION, SOLUTION INTRAVENOUS
Refills: 0 | Status: DISCONTINUED | OUTPATIENT
Start: 2023-11-03 | End: 2023-11-03

## 2023-11-03 RX ORDER — CEFAZOLIN SODIUM 1 G
2000 VIAL (EA) INJECTION EVERY 8 HOURS
Refills: 0 | Status: DISCONTINUED | OUTPATIENT
Start: 2023-11-03 | End: 2023-11-03

## 2023-11-03 RX ORDER — OXYCODONE HYDROCHLORIDE 5 MG/1
10 TABLET ORAL ONCE
Refills: 0 | Status: DISCONTINUED | OUTPATIENT
Start: 2023-11-03 | End: 2023-11-03

## 2023-11-03 RX ORDER — POLYETHYLENE GLYCOL 3350 17 G/17G
17 POWDER, FOR SOLUTION ORAL AT BEDTIME
Refills: 0 | Status: DISCONTINUED | OUTPATIENT
Start: 2023-11-03 | End: 2023-11-04

## 2023-11-03 RX ORDER — DOCUSATE SODIUM 100 MG
1 CAPSULE ORAL
Qty: 14 | Refills: 0
Start: 2023-11-03 | End: 2023-11-09

## 2023-11-03 RX ORDER — ONDANSETRON 8 MG/1
4 TABLET, FILM COATED ORAL EVERY 6 HOURS
Refills: 0 | Status: DISCONTINUED | OUTPATIENT
Start: 2023-11-03 | End: 2023-11-04

## 2023-11-03 RX ORDER — MAGNESIUM HYDROXIDE 400 MG/1
30 TABLET, CHEWABLE ORAL DAILY
Refills: 0 | Status: DISCONTINUED | OUTPATIENT
Start: 2023-11-03 | End: 2023-11-04

## 2023-11-03 RX ADMIN — ONDANSETRON 4 MILLIGRAM(S): 8 TABLET, FILM COATED ORAL at 20:31

## 2023-11-03 RX ADMIN — Medication 1000 MILLIGRAM(S): at 22:27

## 2023-11-03 RX ADMIN — HYDROMORPHONE HYDROCHLORIDE 0.5 MILLIGRAM(S): 2 INJECTION INTRAMUSCULAR; INTRAVENOUS; SUBCUTANEOUS at 20:50

## 2023-11-03 RX ADMIN — OXYCODONE HYDROCHLORIDE 10 MILLIGRAM(S): 5 TABLET ORAL at 19:19

## 2023-11-03 RX ADMIN — Medication 1000 MILLIGRAM(S): at 22:50

## 2023-11-03 RX ADMIN — Medication 2000 MILLIGRAM(S): at 22:27

## 2023-11-03 RX ADMIN — HYDROMORPHONE HYDROCHLORIDE 0.5 MILLIGRAM(S): 2 INJECTION INTRAMUSCULAR; INTRAVENOUS; SUBCUTANEOUS at 20:31

## 2023-11-03 RX ADMIN — OXYCODONE HYDROCHLORIDE 10 MILLIGRAM(S): 5 TABLET ORAL at 19:52

## 2023-11-03 NOTE — DISCHARGE NOTE PROVIDER - NSDCFUADDINST_GEN_ALL_CORE_FT
ORIF DC Instructions:    1.	Analgesia  2.	Non-Weight Bearing left upper Extremity  3.	Follow up with Orthopedic Surgeon Dr. Love in 10-14 Days after Discharge from the Hospital. Call Office For Appointment.  4.	Staples/Sutures to be removed Post-Op Day 14, and repeat x-rays in office.  5.	Elevate the extremity as much as possible  6.	Keep bandage/Splint Clean and dry. Do not get it wet. Do not put any body weight on splint because it will break.

## 2023-11-03 NOTE — DISCHARGE NOTE PROVIDER - NSDCMRMEDTOKEN_GEN_ALL_CORE_FT
Colace 100 mg oral capsule: 1 cap(s) orally 2 times a day as needed for -for constipation  ondansetron 4 mg oral tablet: 1 tab(s) orally every 6 hours  oxyCODONE 5 mg oral tablet: 1 tab(s) orally every 6 hours as needed for  severe pain MDD: 4  Tylenol Extra Strength 500 mg oral tablet: 2 tab(s) orally every 8 hours

## 2023-11-03 NOTE — BRIEF OPERATIVE NOTE - NSICDXBRIEFPROCEDURE_GEN_ALL_CORE_FT
PROCEDURES:  Open reduction and internal fixation (ORIF) of shaft of left humerus 03-Nov-2023 18:17:07  Sebastian Zhao

## 2023-11-03 NOTE — DISCHARGE NOTE PROVIDER - NSDCCPCAREPLAN_GEN_ALL_CORE_FT
PRINCIPAL DISCHARGE DIAGNOSIS  Diagnosis: Fracture of humeral shaft, left, closed  Assessment and Plan of Treatment:

## 2023-11-03 NOTE — DISCHARGE NOTE PROVIDER - HOSPITAL COURSE
The patient is a 23 year old malestatus post Open Reduction Surgical Fixation of a left humeral shaft Fracture. The Patient was medically Optimized for the Previously mentioned surgical procedure. The patient was taken to the operating room on date mentioned above. Prophylactic antibiotics were started before the procedure and continued for 24 hours.  There were no complications during the procedure and patient tolerated the procedure well.  The patient was transferred to recovery room in stable condition and subsequently to surgical floor.  Patient was placed on SCDs for DVT ppx.  All home medications were continued.  The patient received physical therapy daily and daily labs were followed. The splint was kept clean, dry, intact. The rest of the hospital stay was unremarkable

## 2023-11-03 NOTE — DISCHARGE NOTE PROVIDER - NSDCCPTREATMENT_GEN_ALL_CORE_FT
PRINCIPAL PROCEDURE  Procedure: Open reduction and internal fixation (ORIF) of shaft of left humerus  Findings and Treatment:       SECONDARY PROCEDURE  Procedure: Open reduction and internal fixation (ORIF) of shaft of left humerus  Findings and Treatment:

## 2023-11-03 NOTE — DISCHARGE NOTE PROVIDER - CARE PROVIDER_API CALL
Irene Love  Orthopaedic Surgery  [address] [place] [address]  Phone: [phone]  Fax: [phone]  Follow Up Time: 2 weeks

## 2023-11-03 NOTE — ASU PATIENT PROFILE, ADULT - FALL HARM RISK - RISK INTERVENTIONS

## 2023-11-04 VITALS
OXYGEN SATURATION: 96 % | TEMPERATURE: 98 F | HEART RATE: 97 BPM | RESPIRATION RATE: 18 BRPM | DIASTOLIC BLOOD PRESSURE: 72 MMHG | SYSTOLIC BLOOD PRESSURE: 128 MMHG

## 2023-11-04 LAB
ANION GAP SERPL CALC-SCNC: 9 MMOL/L — SIGNIFICANT CHANGE UP (ref 5–17)
ANION GAP SERPL CALC-SCNC: 9 MMOL/L — SIGNIFICANT CHANGE UP (ref 5–17)
BUN SERPL-MCNC: 18 MG/DL — SIGNIFICANT CHANGE UP (ref 7–23)
BUN SERPL-MCNC: 18 MG/DL — SIGNIFICANT CHANGE UP (ref 7–23)
CALCIUM SERPL-MCNC: 8.9 MG/DL — SIGNIFICANT CHANGE UP (ref 8.5–10.1)
CALCIUM SERPL-MCNC: 8.9 MG/DL — SIGNIFICANT CHANGE UP (ref 8.5–10.1)
CHLORIDE SERPL-SCNC: 101 MMOL/L — SIGNIFICANT CHANGE UP (ref 96–108)
CHLORIDE SERPL-SCNC: 101 MMOL/L — SIGNIFICANT CHANGE UP (ref 96–108)
CO2 SERPL-SCNC: 27 MMOL/L — SIGNIFICANT CHANGE UP (ref 22–31)
CO2 SERPL-SCNC: 27 MMOL/L — SIGNIFICANT CHANGE UP (ref 22–31)
CREAT SERPL-MCNC: 0.96 MG/DL — SIGNIFICANT CHANGE UP (ref 0.5–1.3)
CREAT SERPL-MCNC: 0.96 MG/DL — SIGNIFICANT CHANGE UP (ref 0.5–1.3)
EGFR: 114 ML/MIN/1.73M2 — SIGNIFICANT CHANGE UP
EGFR: 114 ML/MIN/1.73M2 — SIGNIFICANT CHANGE UP
GLUCOSE SERPL-MCNC: 126 MG/DL — HIGH (ref 70–99)
GLUCOSE SERPL-MCNC: 126 MG/DL — HIGH (ref 70–99)
HCT VFR BLD CALC: 39 % — SIGNIFICANT CHANGE UP (ref 39–50)
HCT VFR BLD CALC: 39 % — SIGNIFICANT CHANGE UP (ref 39–50)
HGB BLD-MCNC: 13 G/DL — SIGNIFICANT CHANGE UP (ref 13–17)
HGB BLD-MCNC: 13 G/DL — SIGNIFICANT CHANGE UP (ref 13–17)
MCHC RBC-ENTMCNC: 27.8 PG — SIGNIFICANT CHANGE UP (ref 27–34)
MCHC RBC-ENTMCNC: 27.8 PG — SIGNIFICANT CHANGE UP (ref 27–34)
MCHC RBC-ENTMCNC: 33.3 GM/DL — SIGNIFICANT CHANGE UP (ref 32–36)
MCHC RBC-ENTMCNC: 33.3 GM/DL — SIGNIFICANT CHANGE UP (ref 32–36)
MCV RBC AUTO: 83.3 FL — SIGNIFICANT CHANGE UP (ref 80–100)
MCV RBC AUTO: 83.3 FL — SIGNIFICANT CHANGE UP (ref 80–100)
PLATELET # BLD AUTO: 355 K/UL — SIGNIFICANT CHANGE UP (ref 150–400)
PLATELET # BLD AUTO: 355 K/UL — SIGNIFICANT CHANGE UP (ref 150–400)
POTASSIUM SERPL-MCNC: 4.2 MMOL/L — SIGNIFICANT CHANGE UP (ref 3.5–5.3)
POTASSIUM SERPL-MCNC: 4.2 MMOL/L — SIGNIFICANT CHANGE UP (ref 3.5–5.3)
POTASSIUM SERPL-SCNC: 4.2 MMOL/L — SIGNIFICANT CHANGE UP (ref 3.5–5.3)
POTASSIUM SERPL-SCNC: 4.2 MMOL/L — SIGNIFICANT CHANGE UP (ref 3.5–5.3)
RBC # BLD: 4.68 M/UL — SIGNIFICANT CHANGE UP (ref 4.2–5.8)
RBC # BLD: 4.68 M/UL — SIGNIFICANT CHANGE UP (ref 4.2–5.8)
RBC # FLD: 13.9 % — SIGNIFICANT CHANGE UP (ref 10.3–14.5)
RBC # FLD: 13.9 % — SIGNIFICANT CHANGE UP (ref 10.3–14.5)
SODIUM SERPL-SCNC: 137 MMOL/L — SIGNIFICANT CHANGE UP (ref 135–145)
SODIUM SERPL-SCNC: 137 MMOL/L — SIGNIFICANT CHANGE UP (ref 135–145)
WBC # BLD: 17.5 K/UL — HIGH (ref 3.8–10.5)
WBC # BLD: 17.5 K/UL — HIGH (ref 3.8–10.5)
WBC # FLD AUTO: 17.5 K/UL — HIGH (ref 3.8–10.5)
WBC # FLD AUTO: 17.5 K/UL — HIGH (ref 3.8–10.5)

## 2023-11-04 RX ORDER — CEPHALEXIN 500 MG
1 CAPSULE ORAL
Qty: 12 | Refills: 0
Start: 2023-11-04 | End: 2023-11-06

## 2023-11-04 RX ADMIN — Medication 1000 MILLIGRAM(S): at 05:39

## 2023-11-04 RX ADMIN — OXYCODONE HYDROCHLORIDE 10 MILLIGRAM(S): 5 TABLET ORAL at 12:45

## 2023-11-04 RX ADMIN — Medication 2000 MILLIGRAM(S): at 05:25

## 2023-11-04 RX ADMIN — OXYCODONE HYDROCHLORIDE 10 MILLIGRAM(S): 5 TABLET ORAL at 01:01

## 2023-11-04 RX ADMIN — OXYCODONE HYDROCHLORIDE 10 MILLIGRAM(S): 5 TABLET ORAL at 06:05

## 2023-11-04 RX ADMIN — OXYCODONE HYDROCHLORIDE 10 MILLIGRAM(S): 5 TABLET ORAL at 01:45

## 2023-11-04 RX ADMIN — OXYCODONE HYDROCHLORIDE 10 MILLIGRAM(S): 5 TABLET ORAL at 13:15

## 2023-11-04 RX ADMIN — Medication 1000 MILLIGRAM(S): at 05:25

## 2023-11-04 RX ADMIN — OXYCODONE HYDROCHLORIDE 10 MILLIGRAM(S): 5 TABLET ORAL at 05:29

## 2023-11-04 NOTE — DISCHARGE NOTE NURSING/CASE MANAGEMENT/SOCIAL WORK - PATIENT PORTAL LINK FT
You can access the FollowMyHealth Patient Portal offered by [facility] by registering at the following website: [email or web address]. By joining [facility]’s FollowMyHealth portal, you will also be able to view your health information using other applications (apps) compatible with our system.

## 2023-11-04 NOTE — PHYSICAL THERAPY INITIAL EVALUATION ADULT - ACTIVE RANGE OF MOTION EXAMINATION, REHAB EVAL
LUE not tested in splint./Right UE Active ROM was WNL (within normal limits)/bilateral lower extremity Active ROM was WNL (within normal limits)

## 2023-11-04 NOTE — PROGRESS NOTE ADULT - PROVIDER SPECIALTY LIST ADULT
Orthopedics
[FreeTextEntry1] : Yearly routine fasting blood work is reviewed.\par \par CAT scan of the chest performed on 5/29/19, at [facility], reveals affected she is status post left upper lobectomy. There is a 2 mm nodule in the right upper lung field which is unchanged. There is also a 6 mm groundglass nodule in the right lobe along with which may be minimally smaller when compared to the prior study.
Orthopedics

## 2023-11-04 NOTE — PROGRESS NOTE ADULT - SUBJECTIVE AND OBJECTIVE BOX
Postop Check    Patient tolerated the procedure well. Patient seen and examined at bedside. No acute complaints at this time. Pain well controlled. Denies chest pain, shortness of breath, nausea or vomiting.     PE:  Vital Signs Last 24 Hrs  T(C): 36.7 (11-03-23 @ 20:55), Max: 37.3 (11-03-23 @ 11:37)  T(F): 98.1 (11-03-23 @ 20:55), Max: 99.1 (11-03-23 @ 11:37)  HR: 106 (11-03-23 @ 20:55) (71 - 108)  BP: 121/73 (11-03-23 @ 20:55) (121/73 - 142/81)  BP(mean): --  RR: 18 (11-03-23 @ 20:55) (12 - 19)  SpO2: 94% (11-03-23 @ 20:55) (94% - 99%)    General: NAD, resting comfortably in bed  LUE:   Dressing C/D/I  Compartments soft and compressible  +Axillary/Musculocutaneous/Median/AIN intact  radial/PIN motor out c/w preop exam  SILT M/R/U/LABC/Ax (decreased sensation over radial distribution)  2+ radial pulses            A/P:  23y s/p L humerus ORIF POD 0  -PT/OT   -NWB LUE  -Pain Control  -DVT ppx SCDs  -Continue perioperative abx x 24 hours  -FU AM Labs  -Rest, ice, compress the extremity as needed  -elevate arm on pillows above level of heart  -Incentive Spirometry  -dispo: home tomorrow
Patient seen and examined at bedside. No acute complaints at this time. Pain well controlled. Denies chest pain, shortness of breath, nausea or vomiting.       LABS:                VITAL SIGNS:  T(C): 36.8 (11-04-23 @ 04:00), Max: 37.3 (11-03-23 @ 11:37)  HR: 91 (11-04-23 @ 04:00) (71 - 108)  BP: 111/65 (11-04-23 @ 04:00) (111/65 - 142/81)  RR: 18 (11-04-23 @ 04:00) (12 - 19)  SpO2: 98% (11-04-23 @ 04:00) (94% - 99%)      PE:    General: NAD, resting comfortably in bed  LUE:   Dressing C/D/I  Compartments soft and compressible  +Axillary/Musculocutaneous/Median/AIN intact  radial/PIN motor out c/w preop exam  SILT M/R/U/LABC/Ax (decreased sensation over radial distribution)  2+ radial pulses          A/P:  23y s/p L humerus ORIF POD 1  -PT/OT   -NWB LUE  -Pain Control  -DVT ppx SCDs  -Continue perioperative abx x 24 hours  -FU AM Labs  -Rest, ice, compress the extremity as needed  -elevate arm on pillows above level of heart  -Incentive Spirometry  -dispo: home today

## 2023-11-04 NOTE — PHYSICAL THERAPY INITIAL EVALUATION ADULT - ADDITIONAL COMMENTS
Pt reports being indep without AD for amb and indep with all ADLs prior. Pt states he has a lot of friends that will check in and help.

## 2023-11-04 NOTE — DISCHARGE NOTE NURSING/CASE MANAGEMENT/SOCIAL WORK - NSDCVIVACCINE_GEN_ALL_CORE_FT
Tdap; 21-Oct-2023 14:08; Radha Gasca (ODELL); [employer]; [id number] (Exp. Date: 03-Oct-2025); IntraMuscular; Deltoid Right.; 0.5 milliLiter(s); VIS (VIS Published: 09-May-2013, VIS Presented: 21-Oct-2023);

## 2023-11-04 NOTE — PHYSICAL THERAPY INITIAL EVALUATION ADULT - GENERAL OBSERVATIONS, REHAB EVAL
Pt seen for 30min PT Eval. Pt rec'd sitting in chair in NAD LUE in splint. Pt indep with all mobility no AD, safe to DC home from PT standpoint. Pt left in chair in NAD, all needs met, RN aware.
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
## 2023-11-09 DIAGNOSIS — S42.342A DISPLACED SPIRAL FRACTURE OF SHAFT OF HUMERUS, LEFT ARM, INITIAL ENCOUNTER FOR CLOSED FRACTURE: ICD-10-CM
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
## 2023-11-09 DIAGNOSIS — S46.312A STRAIN OF MUSCLE, FASCIA AND TENDON OF TRICEPS, LEFT ARM, INITIAL ENCOUNTER: ICD-10-CM
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
## 2023-11-09 DIAGNOSIS — G56.32 LESION OF RADIAL NERVE, LEFT UPPER LIMB: ICD-10-CM

## 2024-02-26 NOTE — PHYSICAL THERAPY INITIAL EVALUATION ADULT - PERTINENT HX OF CURRENT PROBLEM, REHAB EVAL
23y s/p L humerus ORIF POD 1 Normal rate, regular rhythm.  Heart sounds S1, S2.  No murmurs, rubs or gallops. mild left chest wall TTP, no crepitus